# Patient Record
Sex: FEMALE | Race: BLACK OR AFRICAN AMERICAN | NOT HISPANIC OR LATINO | Employment: FULL TIME | URBAN - METROPOLITAN AREA
[De-identification: names, ages, dates, MRNs, and addresses within clinical notes are randomized per-mention and may not be internally consistent; named-entity substitution may affect disease eponyms.]

---

## 2024-02-11 ENCOUNTER — HOSPITAL ENCOUNTER (EMERGENCY)
Facility: HOSPITAL | Age: 64
Discharge: HOME | End: 2024-02-11
Attending: INTERNAL MEDICINE
Payer: COMMERCIAL

## 2024-02-11 VITALS
DIASTOLIC BLOOD PRESSURE: 73 MMHG | HEART RATE: 89 BPM | SYSTOLIC BLOOD PRESSURE: 161 MMHG | HEIGHT: 64 IN | OXYGEN SATURATION: 99 % | BODY MASS INDEX: 29.88 KG/M2 | TEMPERATURE: 99 F | WEIGHT: 175 LBS | RESPIRATION RATE: 20 BRPM

## 2024-02-11 DIAGNOSIS — M25.512 ACUTE PAIN OF LEFT SHOULDER: Primary | ICD-10-CM

## 2024-02-11 DIAGNOSIS — I10 HTN (HYPERTENSION): ICD-10-CM

## 2024-02-11 LAB
HOLD SPECIMEN: NORMAL
TROPONIN I SERPL-MCNC: <0.01 NG/ML (ref 0–0.04)

## 2024-02-11 PROCEDURE — 93005 ELECTROCARDIOGRAM TRACING: CPT

## 2024-02-11 PROCEDURE — 96372 THER/PROPH/DIAG INJ SC/IM: CPT | Performed by: PHYSICIAN ASSISTANT

## 2024-02-11 PROCEDURE — 63600175 PHARM REV CODE 636 W HCPCS: Performed by: PHYSICIAN ASSISTANT

## 2024-02-11 PROCEDURE — 84484 ASSAY OF TROPONIN QUANT: CPT | Performed by: PHYSICIAN ASSISTANT

## 2024-02-11 PROCEDURE — 99284 EMERGENCY DEPT VISIT MOD MDM: CPT | Mod: 25

## 2024-02-11 RX ORDER — METHOCARBAMOL 100 MG/ML
500 INJECTION, SOLUTION INTRAMUSCULAR; INTRAVENOUS
Status: COMPLETED | OUTPATIENT
Start: 2024-02-11 | End: 2024-02-11

## 2024-02-11 RX ORDER — KETOROLAC TROMETHAMINE 10 MG/1
10 TABLET, FILM COATED ORAL EVERY 6 HOURS
Qty: 20 TABLET | Refills: 0 | Status: SHIPPED | OUTPATIENT
Start: 2024-02-11 | End: 2024-02-16

## 2024-02-11 RX ORDER — METHOCARBAMOL 500 MG/1
500 TABLET, FILM COATED ORAL 3 TIMES DAILY
Qty: 15 TABLET | Refills: 0 | Status: SHIPPED | OUTPATIENT
Start: 2024-02-11 | End: 2024-02-16

## 2024-02-11 RX ORDER — TRAMADOL HYDROCHLORIDE 50 MG/1
50 TABLET ORAL EVERY 12 HOURS PRN
Qty: 10 TABLET | Refills: 0 | Status: SHIPPED | OUTPATIENT
Start: 2024-02-11 | End: 2024-02-16

## 2024-02-11 RX ORDER — KETOROLAC TROMETHAMINE 30 MG/ML
30 INJECTION, SOLUTION INTRAMUSCULAR; INTRAVENOUS
Status: COMPLETED | OUTPATIENT
Start: 2024-02-11 | End: 2024-02-11

## 2024-02-11 RX ADMIN — METHOCARBAMOL 500 MG: 100 INJECTION, SOLUTION INTRAMUSCULAR; INTRAVENOUS at 10:02

## 2024-02-11 RX ADMIN — KETOROLAC TROMETHAMINE 30 MG: 30 INJECTION, SOLUTION INTRAMUSCULAR; INTRAVENOUS at 10:02

## 2024-02-11 NOTE — ED PROVIDER NOTES
Encounter Date: 2/11/2024       History     Chief Complaint   Patient presents with    Shoulder Pain     C/o L shoulder pain since yesterday morning, denies any injury or trauma to shoulder     Patient reports to the emergency room with complaints of left shoulder pain that is worse with certain movements and lifting of her arm; patient reports this happened several years ago and resolved after a few days; patient denies any known injury or trauma    The history is provided by the patient.   Shoulder Pain  This is a new problem. The current episode started more than 2 days ago. The problem has not changed since onset.Pertinent negatives include no chest pain, no abdominal pain, no headaches and no shortness of breath.     Review of patient's allergies indicates:  No Known Allergies  Past Medical History:   Diagnosis Date    Diabetes mellitus     High cholesterol     Hypertension     Vitamin D deficiency      Past Surgical History:   Procedure Laterality Date    HERNIA REPAIR      HYSTERECTOMY      LIPOMA RESECTION      TONSILLECTOMY       History reviewed. No pertinent family history.  Social History     Tobacco Use    Smoking status: Every Day     Current packs/day: 0.50     Types: Cigarettes    Smokeless tobacco: Never   Substance Use Topics    Alcohol use: Yes     Comment: occasionally    Drug use: Never     Review of Systems   Constitutional:  Negative for fever.   HENT:  Negative for sore throat.    Eyes: Negative.    Respiratory:  Negative for shortness of breath.    Cardiovascular:  Negative for chest pain.   Gastrointestinal:  Negative for abdominal pain and nausea.   Genitourinary:  Negative for dysuria.   Musculoskeletal:  Negative for back pain.   Skin:  Negative for rash.   Neurological:  Negative for weakness and headaches.   Hematological:  Does not bruise/bleed easily.   Psychiatric/Behavioral: Negative.         Physical Exam     Initial Vitals [02/11/24 0937]   BP Pulse Resp Temp SpO2   (!) 182/91  110 18 98.4 °F (36.9 °C) 99 %      MAP       --         Physical Exam    Vitals reviewed.  Constitutional: She appears well-developed and well-nourished.   HENT:   Head: Normocephalic and atraumatic.   Eyes: Conjunctivae and EOM are normal. Pupils are equal, round, and reactive to light.   Neck: Neck supple.   Normal range of motion.  Cardiovascular:  Normal rate, regular rhythm, normal heart sounds and intact distal pulses.           Pulmonary/Chest: Breath sounds normal. No respiratory distress. She has no wheezes. She exhibits no tenderness.   Abdominal: Abdomen is soft. Bowel sounds are normal. There is no abdominal tenderness. There is no rebound.   Musculoskeletal:      Right shoulder: Normal.      Left shoulder: Tenderness present. No swelling or deformity. Decreased range of motion. Normal pulse.        Arms:       Cervical back: Normal range of motion and neck supple.      Comments: Pain is reproducible with lifting of the arm as well as with palpation of the scapular area     Neurological: She is alert and oriented to person, place, and time. She displays normal reflexes. No cranial nerve deficit or sensory deficit.   Skin: Skin is warm and dry.   Psychiatric: She has a normal mood and affect. Her behavior is normal. Judgment and thought content normal.         ED Course   Procedures  Labs Reviewed   TROPONIN I - Normal   EXTRA TUBES    Narrative:     The following orders were created for panel order EXTRA TUBES.  Procedure                               Abnormality         Status                     ---------                               -----------         ------                     Light Blue Top Hold[[id number]]                              In process                 Lavender Top Hold[[id number]]                                In process                 Lavender Top Hold[[id number]]                                In process                 Gold Top Hold[[id number]]                                    In  process                   Please view results for these tests on the individual orders.   LIGHT BLUE TOP HOLD   LAVENDER TOP HOLD   LAVENDER TOP HOLD   GOLD TOP HOLD     EKG Readings: (Independently Interpreted)   Initial Reading: No STEMI. Rhythm: Normal Sinus Rhythm. Heart Rate: 76. Ectopy: No Ectopy. Conduction: Normal. ST Segments: Normal ST Segments. T Waves: Normal. Clinical Impression: Normal Sinus Rhythm       Imaging Results              X-Ray Chest 1 View (Final result)  Result time 02/11/24 10:45:24      Final result by Lam Rojas MD (02/11/24 10:45:24)                   Impression:      NO ACUTE CARDIOPULMONARY PROCESS IDENTIFIED.      Electronically signed by: Lam Rojas  Date:    02/11/2024  Time:    10:45               Narrative:    EXAMINATION:  XR CHEST 1 VIEW    CLINICAL HISTORY:  htn;    TECHNIQUE:  One view    COMPARISON:  March 7, 2017.    FINDINGS:  Cardiopericardial silhouette is within normal limits.  No acute dense focal or segmental consolidation, congestive process, pleural effusions or pneumothorax.                                       X-Ray Shoulder 2 or More Views Left (Final result)  Result time 02/11/24 10:36:06      Final result by Reji Rome MD (02/11/24 10:36:06)                   Impression:      No acute findings.      Electronically signed by: Reji Rome  Date:    02/11/2024  Time:    10:36               Narrative:    EXAMINATION:  XR SHOULDER COMPLETE 2 OR MORE VIEWS LEFT    CLINICAL HISTORY:  pain;    COMPARISON:  None    FINDINGS:  Three views of the left shoulder demonstrate no fracture or dislocation.  Mild AC joint degenerative changes.                                       Medications   ketorolac injection 30 mg (30 mg Intramuscular Given 2/11/24 1009)   methocarbamoL injection 500 mg (500 mg Intramuscular Given 2/11/24 1010)     Medical Decision Making  Amount and/or Complexity of Data Reviewed  Radiology: ordered.    Risk  Prescription drug  management.  Risk Details: Given strict ED return precautions. I have spoken with the patient and/or caregivers. I have explained the patient's condition, diagnoses and treatment plan based on the information available to me at this time. I have answered the patient's and/or caregiver's questions and addressed any concerns. The patient and/or caregivers have as good an understanding of the patient's diagnosis, condition and treatment plan as can be expected at this point. The vital signs have been stable. The patient's condition is stable and appropriate for discharge from the emergency department.      The patient will pursue further outpatient evaluation with the primary care physician or other designated or consulting physician as outlined in the discharge instructions. The patient and/or caregivers are agreeable to this plan of care and follow-up instructions have been explained in detail. The patient and/or caregivers have received these instructions in written format and have expressed an understanding of the discharge instructions. The patient and/or caregivers are aware that any significant change in condition or worsening of symptoms should prompt an immediate return to this or the closest emergency department or a call to 911.               ED Course as of 02/11/24 1135   Sun Feb 11, 2024   1132 Patient reports improvement of pain symptoms as well as improvement of range of motion [AL]   1132 BP(!): 161/73 [AL]   1132 Temp: 98.7 °F (37.1 °C) [AL]   1132 Pulse: 89 [AL]   1132 Resp: 20 [AL]   1132 SpO2: 99 % [AL]      ED Course User Index  [AL] Seymour Godfrey PA                           Clinical Impression:  Final diagnoses:  [M25.512] Acute pain of left shoulder (Primary)  [I10] HTN (hypertension)          ED Disposition Condition    Discharge Stable          ED Prescriptions       Medication Sig Dispense Start Date End Date Auth. Provider    ketorolac (TORADOL) 10 mg tablet Take 1 tablet (10 mg total)  by mouth every 6 (six) hours. for 5 days 20 tablet 2/11/2024 2/16/2024 Seymour Godfrey PA    methocarbamoL (ROBAXIN) 500 MG Tab Take 1 tablet (500 mg total) by mouth 3 (three) times daily. for 5 days 15 tablet 2/11/2024 2/16/2024 Seymour Godfrey PA    traMADoL (ULTRAM) 50 mg tablet Take 1 tablet (50 mg total) by mouth every 12 (twelve) hours as needed for Pain. 10 tablet 2/11/2024 2/16/2024 Seymour Godfrey PA          Follow-up Information       Follow up With Specialties Details Why Contact Info    discharge followup    If your symptoms become WORSE or you DO NOT IMPROVE and you are unable to reach your health care provider, you should RETURN to the emergency department    discharge info    Discussed all pertinent ED information, results, diagnosis and treatment plan; All questions and concerns were addressed at this time. Patient voices understanding of information and instructions. Patient is comfortable with plan and discharge             Seymour Godfrey PA  02/11/24 9465

## 2024-02-14 LAB
OHS QRS DURATION: 80 MS
OHS QTC CALCULATION: 409 MS

## 2024-04-08 ENCOUNTER — HOSPITAL ENCOUNTER (EMERGENCY)
Facility: HOSPITAL | Age: 64
Discharge: HOME | End: 2024-04-08
Attending: FAMILY MEDICINE
Payer: COMMERCIAL

## 2024-04-08 VITALS
HEIGHT: 64 IN | BODY MASS INDEX: 30.73 KG/M2 | RESPIRATION RATE: 18 BRPM | HEART RATE: 93 BPM | SYSTOLIC BLOOD PRESSURE: 185 MMHG | TEMPERATURE: 98 F | OXYGEN SATURATION: 99 % | WEIGHT: 180 LBS | DIASTOLIC BLOOD PRESSURE: 72 MMHG
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
## 2024-04-08 DIAGNOSIS — S52.121A CLOSED DISPLACED FRACTURE OF HEAD OF RIGHT RADIUS, INITIAL ENCOUNTER: Primary | ICD-10-CM

## 2024-04-08 PROCEDURE — 25000003 PHARM REV CODE 250: Performed by: NURSE PRACTITIONER

## 2024-04-08 PROCEDURE — 99283 EMERGENCY DEPT VISIT LOW MDM: CPT | Mod: 25

## 2024-04-08 RX ORDER — HYDROCODONE BITARTRATE AND ACETAMINOPHEN 7.5; 325 MG/1; MG/1
1 TABLET ORAL EVERY 6 HOURS PRN
Qty: 18 TABLET | Refills: 0 | Status: SHIPPED | OUTPATIENT
Start: 2024-04-08

## 2024-04-08 RX ORDER — HYDROCODONE BITARTRATE AND ACETAMINOPHEN 7.5; 325 MG/1; MG/1
1 TABLET ORAL ONCE
Status: COMPLETED | OUTPATIENT
Start: 2024-04-08 | End: 2024-04-08

## 2024-04-08 RX ADMIN — HYDROCODONE BITARTRATE AND ACETAMINOPHEN 1 TABLET: 7.5; 325 TABLET ORAL at 01:04

## 2024-04-08 NOTE — ED PROVIDER NOTES
Encounter Date: 4/8/2024       History     Chief Complaint   Patient presents with    Fall    Arm Injury     The patient presents with right elbow pain.  The onset was earlier today.  The course/duration of symptoms is constant.  Type of injury: foosh injury, fell while roller skating in her carport.  Location: right elbow. Radiating pain: none. The character of symptoms is pain.  The degree of pain is moderate and with certain movements.  The degree of swelling is minimal.  The exacerbating factor is movement.  There are relieving factors including immobilization.  Risk factors consist of none.  Prior episodes: none.  Therapy today: none.  Associated symptoms: none, she denies LOC and any other injury.  Additional history: none.        Review of patient's allergies indicates:  No Known Allergies  Past Medical History:   Diagnosis Date    Diabetes mellitus     High cholesterol     Hypertension     Vitamin D deficiency      Past Surgical History:   Procedure Laterality Date    HERNIA REPAIR      HYSTERECTOMY      LIPOMA RESECTION      TONSILLECTOMY       History reviewed. No pertinent family history.  Social History     Tobacco Use    Smoking status: Every Day     Current packs/day: 0.50     Types: Cigarettes    Smokeless tobacco: Never   Substance Use Topics    Alcohol use: Yes     Comment: occasionally    Drug use: Never     Review of Systems   Constitutional:  Negative for fever.   HENT:  Negative for sore throat.    Respiratory:  Negative for shortness of breath.    Cardiovascular:  Negative for chest pain.   Gastrointestinal:  Negative for nausea.   Genitourinary:  Negative for dysuria.   Musculoskeletal:  Positive for arthralgias. Negative for back pain.   Skin:  Negative for rash.   Neurological:  Negative for weakness.   Hematological:  Does not bruise/bleed easily.   All other systems reviewed and are negative.      Physical Exam     Initial Vitals [04/08/24 0104]   BP Pulse Resp Temp SpO2   (!) 185/72 103  20 98 °F (36.7 °C) 99 %      MAP       --         Physical Exam    Nursing note and vitals reviewed.  Constitutional: She appears well-developed and well-nourished.   HENT:   Head: Normocephalic and atraumatic.   Neck: Neck supple.   Normal range of motion.  Cardiovascular:  Normal rate, regular rhythm, normal heart sounds and intact distal pulses.           Pulmonary/Chest: Effort normal and breath sounds normal.   Abdominal: Abdomen is soft and flat. Bowel sounds are normal. There is no abdominal tenderness.   Musculoskeletal:         General: Normal range of motion.      Cervical back: Normal range of motion and neck supple.      Comments: Mild ttp and swelling to right elbow, decreased rom d/t pain, good distal pulses, NVI     Neurological: She is alert. She has normal strength.   Skin: Skin is warm and dry.   Psychiatric: She has a normal mood and affect.         ED Course   Splint Application    Date/Time: 4/8/2024 1:52 AM    Performed by: Efraín Blackmon ACNP  Authorized by: Logan Stephenson MD  Location details: right arm  Splint type: sling.  Post-procedure: The splinted body part was neurovascularly unchanged following the procedure.  Patient tolerance: Patient tolerated the procedure well with no immediate complications        Labs Reviewed - No data to display       Imaging Results              X-Ray Elbow Complete Right (Preliminary result)  Result time 04/08/24 01:37:25      Preliminary result by Jarrett Nowak MD (04/08/24 01:37:25)                   Narrative:    START OF REPORT:  TECHNIQUE: AP lateral oblique views of the right elbow were submitted for interpretation.    CLINICAL DATA: Trauma. Pain after injuring it this after noon when she fell; best images able to be obtained due to pain and patient unable to stay still for long.    FINDINGS: Please note the study is suboptimal as the lateral view is quite oblique and the other views are not optimal orthogonal views either.  Alignment:  Normal.  Mineralization: Within normal limits.  Joint spaces: There is elevation of the right anterior and posterior fat pads consistent with a joint effusion.  Bones: There is a minimally displaced and possibly depressed fracture of the radial head which is suboptimally imaged.  Degenerative changes: No significant degenerative changes are identified.  Soft Tissues: Visualized soft tissues appear unremarkable.      Impression:  1. Please note the study is suboptimal as the lateral view is quite oblique and the other views are not optimal orthogonal views either.  2. There is a minimally displaced and possibly depressed fracture of the radial head which is suboptimally imaged. Correlate with clinical and laboratory findings as regards additional evaluation and follow-up.  3. There is elevation of the right anterior and posterior fat pads consistent with a joint effusion.                          Wet Read by Efraín Blackmon ACNP (04/08/24 01:30:18, [facility], Emergency Medicine)    Anterior sail sign, possible radial head fracture                                     Medications   HYDROcodone-acetaminophen 7.5-325 mg per tablet 1 tablet (1 tablet Oral Given 4/8/24 0121)     Medical Decision Making  The patient presents with right elbow pain.  The onset was earlier today.  The course/duration of symptoms is constant.  Type of injury: foosh injury, fell while roller skating in her carport.  Location: right elbow. Radiating pain: none. The character of symptoms is pain.  The degree of pain is moderate and with certain movements.  The degree of swelling is minimal.  The exacerbating factor is movement.  There are relieving factors including immobilization.  Risk factors consist of none.  Prior episodes: none.  Therapy today: none.  Associated symptoms: none, she denies LOC and any other injury.  Additional history: none.     Anterior sail sign on xray indicating possible radial head fracture. Right  arm placed in sling and urgent referral sent to ortho clinic.      Amount and/or Complexity of Data Reviewed  Radiology: ordered and independent interpretation performed.    Risk  Prescription drug management.      Additional MDM:   Differential Diagnosis:   At this time differential diagnosis is but not limited to fracture, strain, contusion              ED Course as of 04/08/24 0154   Mon Apr 08, 2024   0130 Anterior sail sign, possible radial head fracture [RB]      ED Course User Index  [RB] Efraín Blackmon ACNP                           Clinical Impression:  Final diagnoses:  [W19.XXXA] Fall  [S52.121A] Closed displaced fracture of head of right radius, initial encounter (Primary)          ED Disposition Condition    Discharge Stable          ED Prescriptions       Medication Sig Dispense Start Date End Date Auth. Provider    HYDROcodone-acetaminophen (NORCO) 7.5-325 mg per tablet Take 1 tablet by mouth every 6 (six) hours as needed for Pain. 18 tablet 4/8/2024 -- Efraín Blackmon ACNP          Follow-up Information       Follow up With Specialties Details Why Contact Info    referral sent to orthopedic clinic        [facility] Emergency Medicine  If symptoms worsen [address] [place] [address] [phone]             Efraín Blackmon ACNP  04/08/24 0154